# Patient Record
Sex: FEMALE | Race: WHITE | NOT HISPANIC OR LATINO
[De-identification: names, ages, dates, MRNs, and addresses within clinical notes are randomized per-mention and may not be internally consistent; named-entity substitution may affect disease eponyms.]

---

## 2017-01-09 ENCOUNTER — APPOINTMENT (OUTPATIENT)
Dept: ULTRASOUND IMAGING | Facility: CLINIC | Age: 52
End: 2017-01-09

## 2017-01-09 ENCOUNTER — APPOINTMENT (OUTPATIENT)
Dept: MAMMOGRAPHY | Facility: CLINIC | Age: 52
End: 2017-01-09

## 2017-01-09 ENCOUNTER — OUTPATIENT (OUTPATIENT)
Dept: OUTPATIENT SERVICES | Facility: HOSPITAL | Age: 52
LOS: 1 days | End: 2017-01-09
Payer: COMMERCIAL

## 2017-01-09 DIAGNOSIS — Z00.8 ENCOUNTER FOR OTHER GENERAL EXAMINATION: ICD-10-CM

## 2017-01-09 PROCEDURE — 76642 ULTRASOUND BREAST LIMITED: CPT

## 2017-01-09 PROCEDURE — 77066 DX MAMMO INCL CAD BI: CPT

## 2017-01-09 PROCEDURE — G0279: CPT

## 2017-04-24 ENCOUNTER — RESULT REVIEW (OUTPATIENT)
Age: 52
End: 2017-04-24

## 2017-06-14 ENCOUNTER — TRANSCRIPTION ENCOUNTER (OUTPATIENT)
Age: 52
End: 2017-06-14

## 2017-12-06 ENCOUNTER — TRANSCRIPTION ENCOUNTER (OUTPATIENT)
Age: 52
End: 2017-12-06

## 2018-11-14 ENCOUNTER — OUTPATIENT (OUTPATIENT)
Dept: OUTPATIENT SERVICES | Facility: HOSPITAL | Age: 53
LOS: 1 days | End: 2018-11-14
Payer: COMMERCIAL

## 2018-11-14 ENCOUNTER — APPOINTMENT (OUTPATIENT)
Dept: MAMMOGRAPHY | Facility: CLINIC | Age: 53
End: 2018-11-14
Payer: COMMERCIAL

## 2018-11-14 DIAGNOSIS — Z00.8 ENCOUNTER FOR OTHER GENERAL EXAMINATION: ICD-10-CM

## 2018-11-14 DIAGNOSIS — Z12.31 ENCOUNTER FOR SCREENING MAMMOGRAM FOR MALIGNANT NEOPLASM OF BREAST: ICD-10-CM

## 2018-11-14 PROCEDURE — 77067 SCR MAMMO BI INCL CAD: CPT

## 2018-11-14 PROCEDURE — 77063 BREAST TOMOSYNTHESIS BI: CPT | Mod: 26

## 2018-11-14 PROCEDURE — 77063 BREAST TOMOSYNTHESIS BI: CPT

## 2018-11-14 PROCEDURE — 77067 SCR MAMMO BI INCL CAD: CPT | Mod: 26

## 2021-04-11 ENCOUNTER — TRANSCRIPTION ENCOUNTER (OUTPATIENT)
Age: 56
End: 2021-04-11

## 2024-09-17 ENCOUNTER — APPOINTMENT (OUTPATIENT)
Dept: FAMILY MEDICINE | Facility: CLINIC | Age: 59
End: 2024-09-17
Payer: COMMERCIAL

## 2024-09-17 VITALS — HEIGHT: 66 IN | BODY MASS INDEX: 30.22 KG/M2 | WEIGHT: 188 LBS

## 2024-09-17 PROCEDURE — 99205 OFFICE O/P NEW HI 60 MIN: CPT

## 2024-09-17 NOTE — HISTORY OF PRESENT ILLNESS
[Home] : at home, [unfilled] , at the time of the visit. [Medical Office: ([facility])___] : at the medical office located in  [Verbal consent obtained from patient] : the patient, [unfilled] [Improved Health] : Improved health [Quality of Life] : Quality of life [Improve Mood] : Improved mood [Decrease Medications] : Decrease medications [Improve Family's Health] : Improve family's health [For My Family/Friends] : For my family/friends [Cut/Track Calories] : cut/track calories [Low Carb Diet (Atkins/Keto)] : low carb diet (Atkins/Keto) [Exercise: ____] : exercise: [unfilled] [Commerial Program: _____] : commercial program: [unfilled] [Poor eating habits] : poor eating habits [Time management] : time management [Cravings] : cravings [Motivation] : motivation [5] : 5 [I usually sleep 6-8 hours] : I usually sleep 6-8 hours [] : 1. During the last 3 months, did you have any episodes of excessive overeating (i.e., eating significantly more than what most people would eat in a similar period of time)?  No [FreeTextEntry1] : PT presenting for [facility] assistance,  HAs been fluctuating weight  Has recent lab work done, no concerns Was low dose antidepressants last year, now has improved severe hot flashes

## 2024-09-17 NOTE — ASSESSMENT
[FreeTextEntry1] :  We reviewed the efforts of weight reduction identifying 3500 calories in pound of body fat and the need to gradually achieve a long term basis for weight reduction  discussed the need to reduce calories for what her current patterns are and to hopefully increase physical activity as well. We discussed menu selection as well as food preparation techniques.  Educated patient on 150 minutes of moderate intensity exercise weekly with strength training twice weekly. Encouraged patient to monitor physical activity    Discussed the importance of a balanced, healthy diet of nourishing foods and consistent meal pattern for long-term success and health maintenance. Encouraged to increase water intake to facilitate adequate hydration and to cut out sugary drinks and alcohol.  Pt educated on eating 4-6 small meals per day, that are high in protein for hunger regulation.  Pt educated on cutting out high-sugar content foods sabotaging wt loss Pt verbalized understanding   I encouraged the patient to keep food records in order to better track calorie consumed. I recommended low fat selections and especially those that are lower in saturated fats. Emphasis would be placed on monitoring portions of meat and having more moderate snacks between meal as well.  Plan:  To continue with nutrition counseling Discussed pro vs cons of medical management vs surgical management. Pt interested in medical at this time. Discussed medication options: Patient counseled on diet and exercise 150mins of moderate intensity exercise/weekly Discussed conservative management for weight loss Discussed nutrition and dietician to help with improvement of diet. Counseled on AE of medications Discussed oral medications vs injectable medications discussed long term AE of weight loss meds and long term effects of obesity. Pt declined any hx of MEN syndrome or thyroid medullary carcinoma or pancreatitis.  due to FDA shortage of injectables, discussed oral meds including phentermine/topamax (Qysmia) and contrave  Discussed metformin off label use for weight loss was also discussed    - will send zepbound 2.5 for PA  if denied, will trial metformin orally

## 2024-09-20 RX ORDER — TIRZEPATIDE 2.5 MG/.5ML
2.5 INJECTION, SOLUTION SUBCUTANEOUS
Qty: 1 | Refills: 0 | Status: ACTIVE | COMMUNITY
Start: 2024-09-17

## 2024-10-11 ENCOUNTER — APPOINTMENT (OUTPATIENT)
Dept: FAMILY MEDICINE | Facility: CLINIC | Age: 59
End: 2024-10-11
Payer: COMMERCIAL

## 2024-10-11 VITALS — HEIGHT: 66 IN | BODY MASS INDEX: 28.93 KG/M2 | WEIGHT: 180 LBS

## 2024-10-11 PROCEDURE — 99214 OFFICE O/P EST MOD 30 MIN: CPT

## 2024-10-29 ENCOUNTER — APPOINTMENT (OUTPATIENT)
Dept: FAMILY MEDICINE | Facility: CLINIC | Age: 59
End: 2024-10-29
Payer: COMMERCIAL

## 2024-10-29 VITALS — BODY MASS INDEX: 27.32 KG/M2 | HEIGHT: 66 IN | WEIGHT: 170 LBS

## 2024-10-29 DIAGNOSIS — R11.0 NAUSEA: ICD-10-CM

## 2024-10-29 PROCEDURE — 99214 OFFICE O/P EST MOD 30 MIN: CPT

## 2024-10-29 RX ORDER — ONDANSETRON 4 MG/1
4 TABLET, ORALLY DISINTEGRATING ORAL
Qty: 30 | Refills: 2 | Status: ACTIVE | COMMUNITY
Start: 2024-10-29 | End: 1900-01-01

## 2025-01-24 ENCOUNTER — APPOINTMENT (OUTPATIENT)
Dept: FAMILY MEDICINE | Facility: CLINIC | Age: 60
End: 2025-01-24
Payer: COMMERCIAL

## 2025-01-24 VITALS — BODY MASS INDEX: 23.78 KG/M2 | HEIGHT: 66 IN | WEIGHT: 148 LBS

## 2025-01-24 DIAGNOSIS — R11.0 NAUSEA: ICD-10-CM

## 2025-01-24 PROCEDURE — 99214 OFFICE O/P EST MOD 30 MIN: CPT | Mod: 95

## 2025-02-21 ENCOUNTER — APPOINTMENT (OUTPATIENT)
Dept: FAMILY MEDICINE | Facility: CLINIC | Age: 60
End: 2025-02-21

## 2025-04-03 ENCOUNTER — APPOINTMENT (OUTPATIENT)
Dept: FAMILY MEDICINE | Facility: CLINIC | Age: 60
End: 2025-04-03
Payer: COMMERCIAL

## 2025-04-03 VITALS — WEIGHT: 143 LBS | HEIGHT: 66 IN | BODY MASS INDEX: 22.98 KG/M2

## 2025-04-03 DIAGNOSIS — R11.0 NAUSEA: ICD-10-CM

## 2025-04-03 PROCEDURE — 99214 OFFICE O/P EST MOD 30 MIN: CPT | Mod: 95

## 2025-04-29 ENCOUNTER — NON-APPOINTMENT (OUTPATIENT)
Age: 60
End: 2025-04-29

## 2025-05-01 ENCOUNTER — APPOINTMENT (OUTPATIENT)
Dept: FAMILY MEDICINE | Facility: CLINIC | Age: 60
End: 2025-05-01
Payer: COMMERCIAL

## 2025-05-01 VITALS — HEIGHT: 66 IN | WEIGHT: 140 LBS | BODY MASS INDEX: 22.5 KG/M2

## 2025-05-01 PROCEDURE — 99214 OFFICE O/P EST MOD 30 MIN: CPT | Mod: 95

## 2025-08-01 ENCOUNTER — APPOINTMENT (OUTPATIENT)
Dept: FAMILY MEDICINE | Facility: CLINIC | Age: 60
End: 2025-08-01
Payer: COMMERCIAL

## 2025-08-01 VITALS — WEIGHT: 137 LBS | HEIGHT: 66 IN | BODY MASS INDEX: 22.02 KG/M2

## 2025-08-01 DIAGNOSIS — R11.0 NAUSEA: ICD-10-CM

## 2025-08-01 PROCEDURE — 99214 OFFICE O/P EST MOD 30 MIN: CPT | Mod: 95
